# Patient Record
Sex: FEMALE | Race: OTHER | HISPANIC OR LATINO | URBAN - METROPOLITAN AREA
[De-identification: names, ages, dates, MRNs, and addresses within clinical notes are randomized per-mention and may not be internally consistent; named-entity substitution may affect disease eponyms.]

---

## 2019-11-18 ENCOUNTER — EMERGENCY (EMERGENCY)
Facility: HOSPITAL | Age: 39
LOS: 1 days | Discharge: HOME | End: 2019-11-18
Attending: EMERGENCY MEDICINE
Payer: MEDICAID

## 2019-11-18 VITALS
WEIGHT: 156.09 LBS | RESPIRATION RATE: 18 BRPM | OXYGEN SATURATION: 99 % | HEART RATE: 74 BPM | TEMPERATURE: 98 F | DIASTOLIC BLOOD PRESSURE: 76 MMHG | HEIGHT: 63 IN | SYSTOLIC BLOOD PRESSURE: 112 MMHG

## 2019-11-18 PROCEDURE — 99284 EMERGENCY DEPT VISIT MOD MDM: CPT

## 2019-11-18 NOTE — ED ADULT TRIAGE NOTE - CHIEF COMPLAINT QUOTE
18 week gestation; seen at [facility] where no fetal heart rate detected; referred for surgery in [place] [facility] but comes in because they don't want to wait for the appointment

## 2019-11-19 VITALS
OXYGEN SATURATION: 99 % | RESPIRATION RATE: 16 BRPM | HEART RATE: 75 BPM | SYSTOLIC BLOOD PRESSURE: 111 MMHG | DIASTOLIC BLOOD PRESSURE: 62 MMHG | TEMPERATURE: 98 F

## 2019-11-19 DIAGNOSIS — O02.1 MISSED ABORTION: ICD-10-CM

## 2019-11-19 PROCEDURE — 99284 EMERGENCY DEPT VISIT MOD MDM: CPT

## 2019-11-19 PROCEDURE — 76805 OB US >/= 14 WKS SNGL FETUS: CPT

## 2019-11-19 PROCEDURE — 76805 OB US >/= 14 WKS SNGL FETUS: CPT | Mod: 26

## 2019-11-19 NOTE — CONSULT NOTE ADULT - SUBJECTIVE AND OBJECTIVE BOX
SANTOS REYES  39y  Female [id number]    [place]  [id number]    HPI: 38 y/o  LMP unknown, 18w6d by YESENIA 4/15/2020 presents to the ED for 2nd opinion s/p diagnosis of fetal demise. Pt reports she was seen at the GYN today (name unknown, associated with [facility]) and no fetal heartbeat was seen on sono. Pt was sent for an official sono which confirmed fetal demise, fetus measuring 14 weeks. Pt is scheduled for a D&E at 8am at [facility] in [place]. Pt presented to [facility] for a 2nd opinion to confirm fetal demise. Pt denies vaginal bleeding, denies abdominal pain/cramping. Denies lightheadedness/dizziness. Denies f/c/n/v. No GYN complaints.         Name of GYN Physician: unknown, associated with [facility]    POB:  ,  pLTCS for NRFHT, 2013     Pgyn: Denies fibroids, cysts, endometriosis, STI's, Abnormal pap smears     Home meds:     Hospital Meds:   MEDICATIONS  (STANDING):    MEDICATIONS  (PRN):      Allergies    No Known Allergies    Intolerances        PAST MEDICAL & SURGICAL HISTORY:      FAMILY HISTORY:      Social History:  Denies smoking use, drug use, alcohol use.       Vital Signs Last 24 Hrs  T(C): 36.5 (2019 23:03), Max: 36.5 (2019 23:03)  T(F): 97.7 (2019 23:03), Max: 97.7 (2019 23:03)  HR: 74 (2019 23:03) (74 - 74)  BP: 112/76 (2019 23:03) (112/76 - 112/76)  BP(mean): --  RR: 18 (2019 23:03) (18 - 18)  SpO2: 99% (2019 23:03) (99% - 99%)    Physical Exam:   General: sitting comfortably in bed, NAD   HEENT: neck supple, full ROM  CV: RR S1S2 no m/r/g  Lungs: CTA b/l, good air flow b/l   Back: No CVA tenderness  Abd: Soft, no fundal tenderness, non-distended.  Bowel sounds present.    : No bleeding on pad  Ext: non-tender b/l, no edema     LABS:                I&O's Detail          RADIOLOGY & ADDITIONAL STUDIES: SANTOS REYES  39y  Female [id number]    [place]  [id number]    HPI: 38 y/o  LMP unknown, 18w6d by YESENIA 4/15/2020 presents to the ED for 2nd opinion s/p diagnosis of fetal demise. Pt reports she was seen at the GYN today (name unknown, associated with [facility]) and no fetal heartbeat was seen on sono. Pt was sent for an official sono which confirmed fetal demise, fetus measuring 14 weeks. Pt is scheduled for a D&E at 8am at [facility] in [place]. Pt presented to [facility] for a 2nd opinion to confirm fetal demise. Pt denies vaginal bleeding, denies abdominal pain/cramping. Denies lightheadedness/dizziness. Denies f/c/n/v. No GYN complaints.     Bedside sono by ED physician confirmed absence of FH    Name of GYN Physician: unknown, associated with [facility]    POB:  ,  pLTCS for NRFHT, 2013     Pgyn: Denies fibroids, cysts, endometriosis, STI's, Abnormal pap smears     Home meds:     Hospital Meds:   MEDICATIONS  (STANDING):    MEDICATIONS  (PRN):      Allergies    No Known Allergies    Intolerances        PAST MEDICAL & SURGICAL HISTORY:      FAMILY HISTORY:      Social History:  Denies smoking use, drug use, alcohol use.       Vital Signs Last 24 Hrs  T(C): 36.5 (2019 23:03), Max: 36.5 (2019 23:03)  T(F): 97.7 (2019 23:03), Max: 97.7 (2019 23:03)  HR: 74 (2019 23:03) (74 - 74)  BP: 112/76 (2019 23:03) (112/76 - 112/76)  BP(mean): --  RR: 18 (2019 23:03) (18 - 18)  SpO2: 99% (2019 23:03) (99% - 99%)    Physical Exam:   General: sitting comfortably in bed, NAD   HEENT: neck supple, full ROM  CV: RR S1S2 no m/r/g  Lungs: CTA b/l, good air flow b/l   Back: No CVA tenderness  Abd: Soft, no fundal tenderness, non-distended.  Bowel sounds present.    : No bleeding on pad  Ext: non-tender b/l, no edema     LABS:                I&O's Detail          RADIOLOGY & ADDITIONAL STUDIES:

## 2019-11-19 NOTE — CONSULT NOTE ADULT - ASSESSMENT
38 y/o  LMP unknown, 18w6d by YESENIA 4/15/2020 presents to the ED for 2nd opinion s/p diagnosis of fetal demise at OSH. Confirmation of absence of FH in the ED. Pt is asymptomatic without vaginal bleeding or fundal tenderness.

## 2019-11-19 NOTE — CONSULT NOTE ADULT - PROBLEM SELECTOR RECOMMENDATION 9
-No acute GYN intervention needed  -Encouraged pt to keep appointment for D&E at [facility] this morning, counseled pt that if she wishes to have D&E at [facility] it may take 2-3 weeks for scheduling. Pt agrees to keep appointment at [facility]  -Provided pt with [facility] phone number and address for future GYN needs      D/w Dr. Son [facility] pgy-2

## 2019-11-19 NOTE — ED PROCEDURE NOTE - ATTENDING CONTRIBUTION TO CARE
Nursing Note by Abigail Antonio RN at 11/24/17 03:23 PM     Author:  Abigail Antonio RN Service:  (none) Author Type:  Registered Nurse     Filed:  11/24/17 03:23 PM Encounter Date:  11/24/2017 Status:  Signed     :  Abigail Antonio RN (Registered Nurse)            Ins verified[JW1.1M]  [employer][JW1.1C] ref #[JW1.1M] - [id number][VG4.6J]     ok to sched[JW1.1M]      Revision History        User Key Date/Time User Provider Type Action    > JW1.1 11/24/17 03:23 PM Abigail Antonio RN Registered Nurse Sign    C - Copied, M - Manual I  supervised the critical aspects of the procedure

## 2019-11-19 NOTE — ED PROVIDER NOTE - NSFOLLOWUPINSTRUCTIONS_ED_ALL_ED_FT
Please return to the ED for any concerns. Please make your appointment at 8am in [place].    Regrese al ED para cualquier inquietud. Colten leal thi a las 8 am en [place].

## 2019-11-19 NOTE — ED ADULT NURSE NOTE - NSIMPLEMENTINTERV_GEN_ALL_ED
Implemented All Universal Safety Interventions:  [place] to call system. Call bell, personal items and telephone within reach. Instruct patient to call for assistance. Room bathroom lighting operational. Non-slip footwear when patient is off stretcher. Physically safe environment: no spills, clutter or unnecessary equipment. Stretcher in lowest position, wheels locked, appropriate side rails in place.

## 2019-11-19 NOTE — CONSULT NOTE ADULT - ATTENDING COMMENTS
Attending Note  AGree with above  no FHTS on ultrasound   Referral given to family planning     Jazlyn Cline MD MSc

## 2019-11-19 NOTE — ED PROVIDER NOTE - PATIENT PORTAL LINK FT
You can access the FollowMyHealth Patient Portal offered by [facility] by registering at the following website: [email or web address]. By joining [employer]’s FollowMyHealth portal, you will also be able to view your health information using other applications (apps) compatible with our system.

## 2019-11-19 NOTE — ED PROVIDER NOTE - ATTENDING CONTRIBUTION TO CARE
fetal demise on U/S. pt asymptomatic, well appearing. saw OB. no need for emergent intervention. will f/u today as previously scheduled with OB in [place].

## 2019-11-19 NOTE — ED ADULT NURSE NOTE - OBJECTIVE STATEMENT
38 YO  female with no stated PMH via walk in presenting with complaints of fetal demise. As per patient, she went to OBGYN, where no fetal heartbeat was found on sonography. Pt was scheduled for D*# in [place], but wanted to confirm with second opinion. PT states her fetus was measured at 14 weeks, YESENIA 4/15/2020. Pt denies chest pain, shortness of breath, visual disturbances, numbness/tingling, fever, chills, diaphoresis, headache, nausea, vomiting, constipation, diarrhea, or urinary symptoms.   Pt Axox4, gross neuro intact, PERRL 2 mm. Lungs clear throughout bilateral. S1S2 heard. Abdomen soft, non-tender, non-distended. Skin warm, dry, and intact. Pt placed in position of comfort. Pt educated on call bell system and provided call bell. Bed in lowest position, wheels locked, appropriate side rails raised. Pt denies needs at this time.  family present at bedside.

## 2019-11-19 NOTE — ED PROVIDER NOTE - OBJECTIVE STATEMENT
Dayo KEMP MD PGY2: 38 y/o  LMP unknown, 18w6d by YESENIA 4/15/2020 presents to the ED for 2nd opinion s/p diagnosis of fetal demise. Pt reports she was seen at the GYN today (name unknown, associated with [facility]) and no fetal heartbeat was seen on sono. Pt was sent for an official sono which confirmed fetal demise, fetus measuring 14 weeks. Pt is scheduled for a D&E at 8am at [facility] in [place]. Pt presented to [facility] for a 2nd opinion to confirm fetal demise. Pt denies vaginal bleeding, denies abdominal pain/cramping. Denies lightheadedness/dizziness. Denies f/c/n/v. No GYN complaints.

## 2019-11-19 NOTE — ED PROVIDER NOTE - PHYSICAL EXAMINATION
Dayo KEMP MD PGY2:   PHYSICAL EXAM:    GENERAL: NAD, well-developed  HEENT:  Atraumatic, Normocephalic  CHEST/LUNG: Chest rise equal bilaterally  HEART: Regular rate and rhythm  ABDOMEN: Soft, Nontender, Nondistended  EXTREMITIES:  2+ Peripheral Pulses.  PSYCH: A&Ox3  SKIN: No obvious rashes or lesions

## 2019-11-19 NOTE — ED ADULT NURSE NOTE - CHIEF COMPLAINT QUOTE
18 week gestation; seen at [facility] where no fetal heart rate detected; referred for surgery in [facility] but comes in because they don't want to wait for the appointment

## 2024-03-28 NOTE — ED PROVIDER NOTE - CLINICAL SUMMARY MEDICAL DECISION MAKING FREE TEXT BOX
98.8
Dayo KEMP MD PGY2: Patient here for second opinion of diagnosed fetal demise. Has appointment in [place] for a D&C and was wondering if OBGYN here would be able to offer her a local alternative. Will obtain US to evaluate fetal activity, and will c/s OB to determine if patient can follow-up locally.